# Patient Record
Sex: MALE | Race: WHITE | NOT HISPANIC OR LATINO | Employment: FULL TIME | URBAN - NONMETROPOLITAN AREA
[De-identification: names, ages, dates, MRNs, and addresses within clinical notes are randomized per-mention and may not be internally consistent; named-entity substitution may affect disease eponyms.]

---

## 2019-06-06 ENCOUNTER — OFFICE VISIT (OUTPATIENT)
Dept: ORTHOPEDIC SURGERY | Facility: CLINIC | Age: 69
End: 2019-06-06

## 2019-06-06 VITALS — TEMPERATURE: 97.6 F | HEIGHT: 75 IN | WEIGHT: 215.8 LBS | BODY MASS INDEX: 26.83 KG/M2

## 2019-06-06 DIAGNOSIS — M67.52 PLICA SYNDROME, LEFT KNEE: Primary | ICD-10-CM

## 2019-06-06 DIAGNOSIS — M54.5 LOW BACK PAIN, UNSPECIFIED BACK PAIN LATERALITY, UNSPECIFIED CHRONICITY, WITH SCIATICA PRESENCE UNSPECIFIED: ICD-10-CM

## 2019-06-06 PROCEDURE — 99204 OFFICE O/P NEW MOD 45 MIN: CPT | Performed by: ORTHOPAEDIC SURGERY

## 2019-06-06 PROCEDURE — 20610 DRAIN/INJ JOINT/BURSA W/O US: CPT | Performed by: ORTHOPAEDIC SURGERY

## 2019-06-06 RX ORDER — NAPROXEN 500 MG/1
TABLET ORAL
COMMUNITY
Start: 2019-04-30

## 2019-06-06 RX ORDER — ATORVASTATIN CALCIUM 40 MG/1
TABLET, FILM COATED ORAL
COMMUNITY
Start: 2019-04-23

## 2019-06-06 RX ORDER — DULAGLUTIDE 0.75 MG/.5ML
INJECTION, SOLUTION SUBCUTANEOUS
COMMUNITY
Start: 2019-04-06

## 2019-06-06 RX ORDER — PIOGLITAZONEHYDROCHLORIDE 30 MG/1
TABLET ORAL
COMMUNITY
Start: 2019-06-04

## 2019-06-06 RX ORDER — GLIMEPIRIDE 4 MG/1
TABLET ORAL
COMMUNITY
Start: 2019-04-23

## 2019-06-06 RX ORDER — DICLOFENAC SODIUM 75 MG/1
TABLET, DELAYED RELEASE ORAL
COMMUNITY
Start: 2019-04-20

## 2019-06-06 RX ADMIN — METHYLPREDNISOLONE ACETATE 160 MG: 80 INJECTION, SUSPENSION INTRA-ARTICULAR; INTRALESIONAL; INTRAMUSCULAR; SOFT TISSUE at 09:18

## 2019-06-06 RX ADMIN — LIDOCAINE HYDROCHLORIDE 2 ML: 10 INJECTION, SOLUTION EPIDURAL; INFILTRATION; INTRACAUDAL; PERINEURAL at 09:18

## 2019-06-06 NOTE — PROGRESS NOTES
NEW VISIT    Patient: Sy Soto  ?  YOB: 1950    MRN: [id number]  ?  Chief Complaint   Patient presents with   • Left Knee - Pain      ?  HPI: The patient has been complaining of left knee pain on the medial aspect of the knee.  He has difficulty with squatting on the ground.  He works as a  about 20 to 25 hours a week.  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  He denies any recent history of trauma.  He states that his pain has been going on for 2 years.  He is here for second opinion.  He was seen by Dr. Annalise Ramos in [facility].  He has had 2 injections to his knee medially which did not help him at all.  Patient states that he also has some chronic low back pain which is radiating to the hip, the buttock and the thigh.  The pain works in conjunction with his knee pain and causes him to limp by the end of the day.      Pain Location: Medial aspect of the left knee in the vicinity of the medial epicondyle.  Radiation: none  Quality: dull and aching  Intensity/Severity: moderate  Duration: About 2 years.  Onset quality: gradual   Timing: intermittent  Aggravating Factors: Deep flexion of the knee and squatting on the ground.  Alleviating Factors: Using a brace and anti-inflammatory medication.  Previous Episodes: yes  Associated Symptoms: pain, swelling, decreased ROM  Previous Treatment: Intra-articular injection of steroid which elevated his blood sugar level significantly.    This patient is a new patient.  This problem is new to this examiner.      Allergies: No Known Allergies    Medications:   Home Medications:  Current Outpatient Medications on File Prior to Visit   Medication Sig   • atorvastatin (LIPITOR) 40 MG tablet    • diclofenac (VOLTAREN) 75 MG EC tablet    • glimepiride (AMARYL) 4 MG tablet    • metFORMIN (GLUCOPHAGE) 1000 MG tablet    • naproxen (NAPROSYN) 500 MG tablet    • ONE TOUCH ULTRA TEST test  "strip    • pioglitazone (ACTOS) 30 MG tablet    • TRULICITY 0.75 MG/0.5ML solution pen-injector      No current facility-administered medications on file prior to visit.      Current Medications:  Scheduled Meds:  PRN Meds:.    I have reviewed the patient's medical history in detail and updated the computerized patient record.  Review and summarization of old records include:    Past Medical History:   Diagnosis Date   • Diabetes (CMS/[facility])      History reviewed. No pertinent surgical history.  Social History     Occupational History   • Not on file   Tobacco Use   • Smoking status: Never Smoker   • Smokeless tobacco: Never Used   Substance and Sexual Activity   • Alcohol use: No     Frequency: Never   • Drug use: Defer   • Sexual activity: Defer      Social History     Social History Narrative   • Not on file     History reviewed. No pertinent family history.      Review of Systems   Constitutional: Negative.  Negative for fever.   Eyes: Negative.    Respiratory: Negative.    Cardiovascular: Negative.    Endocrine: Negative.    Musculoskeletal: Positive for arthralgias, gait problem and joint swelling.   Skin: Negative.  Negative for rash and wound.   Allergic/Immunologic: Negative.    Neurological: Negative for numbness.   Hematological: Negative.    Psychiatric/Behavioral: Negative.           Wt Readings from Last 3 Encounters:   06/06/19 97.9 kg (215 lb 12.8 oz)     Ht Readings from Last 3 Encounters:   06/06/19 190.5 cm (75\")     Body mass index is 26.97 kg/m².  Facility age limit for growth percentiles is 20 years.  Vitals:    06/06/19 0849   Temp: 97.6 °F (36.4 °C)         Physical Exam   Constitutional: Patient is oriented to person, place, and time. Appears well-developed and well-nourished.   HENT:   Head: Normocephalic and atraumatic.   Eyes: Conjunctivae and EOM are normal. Pupils are equal, round, and reactive to light.   Cardiovascular: Normal rate, regular rhythm, normal heart sounds and intact distal " pulses.   Pulmonary/Chest: Effort normal and breath sounds normal.   Musculoskeletal:   See detailed exam below   Neurological: Alert and oriented to person, place, a5nd time. No sensory deficit. Coordination normal.   Skin: Skin is warm and dry. Capillary refill takes less than 2 seconds. No rash noted. No erythema.   Psychiatric: Patient has a normal mood and affect. Her behavior is normal. Judgment and thought content normal.   Nursing note and vitals reviewed.    Ortho Exam: Left knee:  The patient has exquisite pain and tenderness in the medial aspect of the knee. There is a thick plica that is palpable over the medial epicondyle extending towards the patella. Apprehension sign of the patella is negative. Range of motion is from 0-120 degrees of flexion. Pain level is 6.  Anterior and posterior drawer signs are negative. Dorsalis pedis and posterior tibial artery pulses are palpable distally.  Neurovascular status is intact. In full flexion, the patient’s knee pain is a lot worse and the tenderness is specifically over the medial patellofemoral joint. Patient also has some tenderness over the medial face of the tibia, possibly at the level of the pes anserine tendon insertion. There is some swelling of the hamstring sheath posteromedially as well. Patient also has some tenderness laterally over the IT band with a frictional rub over the lateral condyle of the femur. Straight leg raise exam causes the patient to have quite a bit of pain and discomfort. Extension against resistance is similarly met with pain both medially and laterally. Farzana’s sign is equivocal.     Lumbar Spine: The overlying skin and soft tissues are mildly swollen. Deep tendon reflexes are bilaterally, symmetric and equal.  No long tract signs are noted. There is No evidence of myelopathy. No bowel or bladder deficit noted. Mild spasm of erector spinae muscle is noted. left sided rotation is associated with pain and discomfort. Straight  leg raise test is positive to 60 degrees. Contralateral straight leg raise is negative. Pain radiates to buttock and thigh. Get up and go is slow due to the lumbar pain.No objective motor or sensory function loss is noted.       Diagnostics: X-ray from outside facility are reviewed.  Images of the knee AP and lateral are discussed with the patient.  There is mild degenerative change on the medial tibiofemoral compartment.  Soft tissues are normal.  There are no other films to compare these images to.  These images are consistent with early degenerative change over the medial compartment of the knee.       Assessment:  There are no diagnoses linked to this encounter.      Large Joint Arthrocentesis: L knee  Date/Time: 6/6/2019 9:18 AM  Consent given by: patient  Site marked: site marked  Timeout: Immediately prior to procedure a time out was called to verify the correct patient, procedure, equipment, support staff and site/side marked as required   Supporting Documentation  Indications: pain   Procedure Details  Location: knee - L knee (plica)  Preparation: Patient was prepped and draped in the usual sterile fashion  Needle size: 25 G  Approach: anteromedial  Medications administered: 160 mg methylPREDNISolone acetate 80 MG/ML; 2 mL lidocaine PF 1% 1 %  Patient tolerance: patient tolerated the procedure well with no immediate complications        ?  Plan    · Discussed diagnosis and treatment options including bracing, physical therapy, oral medication, topical medication, steroid injections,  further imaging.  · Injected patient's left knee at the plica with steroid  from a(n) anteromedial approach.  Patient tolerated the procedure well and no complications were encountered.  · Hinged knee brace provided to patient here in the office  · Rest, ice, compression, and elevation (RICE) therapy  · Stretching and strengthening exercises  · OTC Alternate Ibuprofen and Tylenol as needed   · New prescription for  Pennsaid  · Discussed with patient we may need to proceed with an MRI of the knee in the future if not improving.  · The MRI might show changes with injury to the medial collateral ligament or the medial meniscus.  These images and reports will be used for further decision-making.  · Also discussed with the patient about the possibility of Synvisc Visco supplementation since the diabetes and blood sugar levels might be made worse with steroid injections.  The patient's insurance company will have to sign off on that form of intervention as well.  · I spent in the office today with the patient with a second opinion and reviewing the charts from the previous treating surgeon is 45 minutes.  Greater than 50% of my time was spent face-to-face with the patient going over the possible pathology of his knee and further treatment options.  · Follow up in 3 month(s)    All or portions of this note were scribed by Mike Yoon PA-C. I have reviewed the above and agree with the documentation.    Hamlet Avina MD  06/06/2019

## 2019-06-23 PROBLEM — M54.50 LOW BACK PAIN: Status: ACTIVE | Noted: 2019-06-23

## 2019-06-23 PROBLEM — M67.52 PLICA SYNDROME, LEFT KNEE: Status: ACTIVE | Noted: 2019-06-23

## 2019-06-23 RX ORDER — LIDOCAINE HYDROCHLORIDE 10 MG/ML
2 INJECTION, SOLUTION EPIDURAL; INFILTRATION; INTRACAUDAL; PERINEURAL
Status: COMPLETED | OUTPATIENT
Start: 2019-06-06 | End: 2019-06-06

## 2019-06-23 RX ORDER — METHYLPREDNISOLONE ACETATE 80 MG/ML
160 INJECTION, SUSPENSION INTRA-ARTICULAR; INTRALESIONAL; INTRAMUSCULAR; SOFT TISSUE
Status: COMPLETED | OUTPATIENT
Start: 2019-06-06 | End: 2019-06-06

## 2019-07-03 ENCOUNTER — CONSULT (OUTPATIENT)
Dept: ORTHOPEDIC SURGERY | Facility: CLINIC | Age: 69
End: 2019-07-03

## 2019-07-03 VITALS — WEIGHT: 216 LBS | BODY MASS INDEX: 27.72 KG/M2 | HEIGHT: 74 IN

## 2019-07-03 DIAGNOSIS — M48.061 SPINAL STENOSIS OF LUMBAR REGION WITHOUT NEUROGENIC CLAUDICATION: ICD-10-CM

## 2019-07-03 DIAGNOSIS — M54.5 LOW BACK PAIN, UNSPECIFIED BACK PAIN LATERALITY, UNSPECIFIED CHRONICITY, WITH SCIATICA PRESENCE UNSPECIFIED: Primary | ICD-10-CM

## 2019-07-03 DIAGNOSIS — M54.50 LUMBAR BACK PAIN: ICD-10-CM

## 2019-07-03 PROCEDURE — 99203 OFFICE O/P NEW LOW 30 MIN: CPT | Performed by: ORTHOPAEDIC SURGERY

## 2019-07-03 PROCEDURE — 72100 X-RAY EXAM L-S SPINE 2/3 VWS: CPT | Performed by: ORTHOPAEDIC SURGERY

## 2019-07-03 RX ORDER — LANCETS 33 GAUGE
EACH MISCELLANEOUS
COMMUNITY
Start: 2019-06-19

## 2019-07-03 NOTE — PROGRESS NOTES
New patient or new problem visit    Chief Complaint   Patient presents with   • Lumbar Spine - Consult, Pain       HPI: He complains of low back pain which radiates to the left hip and leg.  The hip pain is the worst complaint and is moderate constant and aching.  Is been present intermittently for 10 years.  He is tried various shots and pills for urgent care visit but had no specific treatment.  He works as a  and does some loading.    PFSH: See chart- reviewed    Review of Systems   Constitutional: Negative for chills, fever and unexpected weight change.   HENT: Negative for trouble swallowing and voice change.    Eyes: Negative for visual disturbance.   Respiratory: Negative for cough and shortness of breath.    Cardiovascular: Negative for chest pain and leg swelling.   Gastrointestinal: Negative for abdominal pain, nausea and vomiting.   Endocrine: Negative for cold intolerance and heat intolerance.   Genitourinary: Negative for difficulty urinating, frequency and urgency.   Musculoskeletal: Positive for back pain and joint swelling.   Skin: Negative for rash and wound.   Allergic/Immunologic: Negative for immunocompromised state.   Neurological: Negative for weakness and numbness.   Hematological: Does not bruise/bleed easily.   Psychiatric/Behavioral: Negative for dysphoric mood. The patient is not nervous/anxious.        PE: Constitutional: Vital signs above-noted.  Awake, alert and oriented    Psychiatric: Affect and insight do not appear grossly disturbed.    Pulmonary: Breathing is unlabored, color is good.    Skin: Warm, dry and normal turgor    Cardiac: Pedal pulses intact.  No edema.    Eyesight and hearing appear adequate for examination purposes      Musculoskeletal:  There is minimal tenderness to percussion and palpation of the spine. Motion appears undisturbed.  Posture is unremarkable to coronal and sagittal inspection.    The skin about the area is intact.  There is no palpable or  visible deformity.  There is no local spasm.       Neurologic:   Reflexes are 2+ and symmetrical in the patellae and achilles.   Motor function is undisturbed in quadriceps, EHL, and gastrocnemius   sensation appears symmetrically intact to light touch.  In the bilateral lower extremities there is no evidence of atrophy.   Clonus is absent..  Gait appears undisturbed.  SLR test negative      MEDICAL DECISION MAKING    XRAY: Plain film x-rays of the lumbar spine show disc space narrowing L4-5 and L5-S1 and generally narrowed disc spaces throughout the lumbar spine.  Well-maintained lordosis however.  No comparison views are available.    Other: n/a    Impression: Lumbar disc degeneration and probable lumbar spinal stenosis    Plan: For now physical therapy and I will see him back as needed.  If he fails to improve he will call and we will order MRI scan of the lumbar spine.